# Patient Record
Sex: MALE
[De-identification: names, ages, dates, MRNs, and addresses within clinical notes are randomized per-mention and may not be internally consistent; named-entity substitution may affect disease eponyms.]

---

## 2018-02-27 ENCOUNTER — APPOINTMENT (OUTPATIENT)
Dept: INTERNAL MEDICINE | Facility: CLINIC | Age: 33
End: 2018-02-27

## 2019-01-30 ENCOUNTER — TRANSCRIPTION ENCOUNTER (OUTPATIENT)
Age: 34
End: 2019-01-30

## 2019-02-07 ENCOUNTER — TRANSCRIPTION ENCOUNTER (OUTPATIENT)
Age: 34
End: 2019-02-07

## 2019-03-19 ENCOUNTER — APPOINTMENT (OUTPATIENT)
Dept: NEUROLOGY | Facility: CLINIC | Age: 34
End: 2019-03-19
Payer: COMMERCIAL

## 2019-03-19 VITALS
DIASTOLIC BLOOD PRESSURE: 90 MMHG | HEART RATE: 57 BPM | WEIGHT: 195 LBS | BODY MASS INDEX: 27.92 KG/M2 | HEIGHT: 70 IN | SYSTOLIC BLOOD PRESSURE: 156 MMHG

## 2019-03-19 DIAGNOSIS — Z82.49 FAMILY HISTORY OF ISCHEMIC HEART DISEASE AND OTHER DISEASES OF THE CIRCULATORY SYSTEM: ICD-10-CM

## 2019-03-19 DIAGNOSIS — M51.26 OTHER INTERVERTEBRAL DISC DISPLACEMENT, LUMBAR REGION: ICD-10-CM

## 2019-03-19 DIAGNOSIS — Z86.79 PERSONAL HISTORY OF OTHER DISEASES OF THE CIRCULATORY SYSTEM: ICD-10-CM

## 2019-03-19 DIAGNOSIS — R42 DIZZINESS AND GIDDINESS: ICD-10-CM

## 2019-03-19 DIAGNOSIS — G44.099 OTHER TRIGEMINAL AUTONOMIC CEPHALGIAS (TAC), NOT INTRACTABLE: ICD-10-CM

## 2019-03-19 DIAGNOSIS — I10 ESSENTIAL (PRIMARY) HYPERTENSION: ICD-10-CM

## 2019-03-19 PROCEDURE — 99245 OFF/OP CONSLTJ NEW/EST HI 55: CPT

## 2019-03-24 NOTE — ASSESSMENT
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

## 2019-03-24 NOTE — PHYSICAL EXAM
[General Appearance - Alert] : alert [General Appearance - In No Acute Distress] : in no acute distress [Oriented To Time, Place, And Person] : oriented to person, place, and time [Impaired Insight] : insight and judgment were intact [Affect] : the affect was normal [Person] : oriented to person [Place] : oriented to place [Time] : oriented to time [Concentration Intact] : normal concentrating ability [Naming Objects] : no difficulty naming common objects [Repeating Phrases] : no difficulty repeating a phrase [Fluency] : fluency intact [Comprehension] : comprehension intact [Cranial Nerves Optic (II)] : visual acuity intact bilaterally,  visual fields full to confrontation, pupils equal round and reactive to light [Cranial Nerves Oculomotor (III)] : extraocular motion intact [Cranial Nerves Trigeminal (V)] : facial sensation intact symmetrically [Cranial Nerves Facial (VII)] : face symmetrical [Cranial Nerves Vestibulocochlear (VIII)] : hearing was intact bilaterally [Cranial Nerves Glossopharyngeal (IX)] : tongue and palate midline [Cranial Nerves Accessory (XI - Cranial And Spinal)] : head turning and shoulder shrug symmetric [Cranial Nerves Hypoglossal (XII)] : there was no tongue deviation with protrusion [Motor Tone] : muscle tone was normal in all four extremities [Motor Strength] : muscle strength was normal in all four extremities [Involuntary Movements] : no involuntary movements were seen [No Muscle Atrophy] : normal bulk in all four extremities [Motor Handedness Right-Handed] : the patient is right hand dominant [Sensation Tactile Decrease] : light touch was intact [Sensation Pain / Temperature Decrease] : pain and temperature was intact [Balance] : balance was intact [2+] : Ankle jerk left 2+ [Sclera] : the sclera and conjunctiva were normal [PERRL With Normal Accommodation] : pupils were equal in size, round, reactive to light, with normal accommodation [Extraocular Movements] : extraocular movements were intact [Optic Disc Abnormality] : the optic disc were normal in size and color [Outer Ear] : the ears and nose were normal in appearance [Hearing Threshold Finger Rub Not Evans] : hearing was normal [Oropharynx] : the oropharynx was normal [Neck Appearance] : the appearance of the neck was normal [Auscultation Breath Sounds / Voice Sounds] : lungs were clear to auscultation bilaterally [Heart Rate And Rhythm] : heart rate was normal and rhythm regular [Heart Sounds] : normal S1 and S2 [Murmurs] : no murmurs [Arterial Pulses Carotid] : carotid pulses were normal with no bruits [Bowel Sounds] : normal bowel sounds [Abdomen Soft] : soft [Abdomen Tenderness] : non-tender [No CVA Tenderness] : no ~M costovertebral angle tenderness [No Spinal Tenderness] : no spinal tenderness [Abnormal Walk] : normal gait [Nail Clubbing] : no clubbing  or cyanosis of the fingernails [Skin Color & Pigmentation] : normal skin color and pigmentation [Skin Turgor] : normal skin turgor [] : no rash [Paresis Pronator Drift Right-Sided] : no pronator drift on the right [Paresis Pronator Drift Left-Sided] : no pronator drift on the left [Motor Strength Upper Extremities Bilaterally] : strength was normal in both upper extremities [Motor Strength Lower Extremities Bilaterally] : strength was normal in both lower extremities [Romberg's Sign] : Romberg's sign was negtive [Allodynia] : no ~T allodynia present [Past-pointing] : there was no past-pointing [Tremor] : no tremor present [Dysdiadochokinesia Bilaterally] : not present [Coordination - Dysmetria Impaired Finger-to-Nose Bilateral] : not present [Coordination - Dysmetria Impaired Heel-to-Shin Bilateral] : not present [Plantar Reflex Right Only] : normal on the right [Plantar Reflex Left Only] : normal on the left [___] : absent on the right [___] : absent on the left [FreeTextEntry8] : Normal, narrow-based gait. No difficulty with tiptoe, heel, and tandem gaits.

## 2019-03-24 NOTE — HISTORY OF PRESENT ILLNESS
[FreeTextEntry1] : This is a 34-year-old right-handed man who states that he has had migraines for many years (only treated with over-the-counter Advil), but starting about 3 years ago, he had these more frequently (5-6 times per month), with additional bouts of vertigo while lying down, sleeping, or sitting associated with nausea. He saw a cardiologist at the [facility] (Dr. Lisset Molina) and underwent cardiac testing (stress test). Subsequently, he was started on azilsartan (Edarbi) 40mg daily, but tapered off of this after 7 months after experiencing worsened vertigo. Since he stopped this medication, he continues to have occasional episodes of vertigo, lasting 30-60 seconds at a time, sometimes treated with Dramamine.\par \par The patient has since experienced sensitivity to light touch and air at his left scalp, which has seemed to radiate down to the left jaw. This started in the beginning of February, and resolved over the course of four weeks.

## 2019-03-24 NOTE — REVIEW OF SYSTEMS
[As Noted in HPI] : as noted in HPI [Negative] : Heme/Lymph [FreeTextEntry2] : Suffered from the flu in December 2018 [FreeTextEntry3] : Occasionally sees a dark, hazy spot at the lateral visual hemifield of the left eye since having the flu in December 2018

## 2019-04-12 ENCOUNTER — MOBILE ON CALL (OUTPATIENT)
Age: 34
End: 2019-04-12

## 2019-04-12 DIAGNOSIS — R20.3 HYPERESTHESIA: ICD-10-CM

## 2019-09-21 ENCOUNTER — TRANSCRIPTION ENCOUNTER (OUTPATIENT)
Age: 34
End: 2019-09-21

## 2024-12-05 ENCOUNTER — OFFICE VISIT (OUTPATIENT)
Age: 39
End: 2024-12-05
Payer: COMMERCIAL

## 2024-12-05 VITALS
SYSTOLIC BLOOD PRESSURE: 116 MMHG | HEART RATE: 90 BPM | OXYGEN SATURATION: 97 % | RESPIRATION RATE: 20 BRPM | HEIGHT: 70 IN | WEIGHT: 216 LBS | DIASTOLIC BLOOD PRESSURE: 81 MMHG | BODY MASS INDEX: 30.92 KG/M2 | TEMPERATURE: 98.2 F

## 2024-12-05 DIAGNOSIS — G43.709 CHRONIC MIGRAINE W/O AURA W/O STATUS MIGRAINOSUS, NOT INTRACTABLE: ICD-10-CM

## 2024-12-05 DIAGNOSIS — I40.1 CHRONIC IDIOPATHIC MYOCARDITIS: Primary | ICD-10-CM

## 2024-12-05 DIAGNOSIS — R00.2 PALPITATIONS: ICD-10-CM

## 2024-12-05 DIAGNOSIS — I49.3 PVC (PREMATURE VENTRICULAR CONTRACTION): ICD-10-CM

## 2024-12-05 DIAGNOSIS — R94.31 ABNORMAL ECG: ICD-10-CM

## 2024-12-05 DIAGNOSIS — R01.1 SYSTOLIC MURMUR: ICD-10-CM

## 2024-12-05 DIAGNOSIS — I10 PRIMARY HYPERTENSION: ICD-10-CM

## 2024-12-05 PROCEDURE — 3074F SYST BP LT 130 MM HG: CPT | Performed by: INTERNAL MEDICINE

## 2024-12-05 PROCEDURE — 3079F DIAST BP 80-89 MM HG: CPT | Performed by: INTERNAL MEDICINE

## 2024-12-05 PROCEDURE — 93000 ELECTROCARDIOGRAM COMPLETE: CPT | Performed by: INTERNAL MEDICINE

## 2024-12-05 PROCEDURE — 99214 OFFICE O/P EST MOD 30 MIN: CPT | Performed by: INTERNAL MEDICINE

## 2024-12-05 ASSESSMENT — ENCOUNTER SYMPTOMS
ALLERGIC/IMMUNOLOGIC NEGATIVE: 1
SHORTNESS OF BREATH: 0
EYES NEGATIVE: 1
GASTROINTESTINAL NEGATIVE: 1

## 2024-12-05 NOTE — PROGRESS NOTES
lack of other medical problems. His relative risk is less than 1% for complications.    4. Dizziness.  He experiences dizziness occasionally when laying down, lasting about 5-10 minutes. This could be related to the palpitations. An echocardiogram is recommended to rule out any structural heart issues.    5. Migraine.  He reports experiencing migraines rarely, about three times a month. No additional treatment is required at this time.    6. Health Maintenance.  An EKG is recommended to ensure there are no new abnormalities since the last one.      Results    1. Chronic idiopathic myocarditis  -     Echo (TTE) complete (PRN contrast/bubble/strain/3D); Future  2. Primary hypertension  -     EKG 12 Lead  3. Systolic murmur  -     Echo (TTE) complete (PRN contrast/bubble/strain/3D); Future  4. Abnormal ECG  5. Palpitations  6. PVC (premature ventricular contraction)  7. Chronic migraine w/o aura w/o status migrainosus, not intractable    No recent echo on file    Results for orders placed or performed in visit on 12/05/24   EKG 12 Lead    Impression    Normal sinus rhythm, normal axis, poor R wave progression.  Left atrial enlargement.  Nonspecific T wave changes. Unchanged from last tracing    ABNORMAL  Francisco Al MD        Return in about 1 year (around 12/5/2025) for follow up.    On this date 12/5/2024 I have spent 35 minutes reviewing previous notes, test results and face to face with the patient discussing the diagnosis and importance of compliance with the treatment plan as well as documenting on the day of the visit.    The patient (or guardian, if applicable) and other individuals in attendance with the patient were advised that Artificial Intelligence will be utilized during this visit to record, process the conversation to generate a clinical note and to support improvement of the AI technology. The patient (or guardian, if applicable) and other individuals in attendance at the appointment consented to

## 2025-03-03 ENCOUNTER — TELEPHONE (OUTPATIENT)
Age: 40
End: 2025-03-03

## 2025-03-03 NOTE — TELEPHONE ENCOUNTER
Patient called and inquired about results of Echo. He was identified by both full name and .  Reviewed Echo, EF 61% normal wall thickness and motion. No Diastolic or Systolic Dysfunction. Valves with no significant reguritation or stenosis.    Patient was notified, and I did assist him with signing up with MyChart.